# Patient Record
Sex: MALE | Race: WHITE
[De-identification: names, ages, dates, MRNs, and addresses within clinical notes are randomized per-mention and may not be internally consistent; named-entity substitution may affect disease eponyms.]

---

## 2020-11-21 ENCOUNTER — HOSPITAL ENCOUNTER (EMERGENCY)
Dept: HOSPITAL 4 - SED | Age: 35
Discharge: HOME | End: 2020-11-21
Payer: COMMERCIAL

## 2020-11-21 VITALS — HEIGHT: 65 IN | WEIGHT: 140 LBS | BODY MASS INDEX: 23.32 KG/M2 | SYSTOLIC BLOOD PRESSURE: 114 MMHG
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
## 2020-11-21 DIAGNOSIS — S61.412A: Primary | ICD-10-CM
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
## 2020-11-21 NOTE — NUR
Pt came to ER for L hand lac, rates pain 4/10, resting in [facility], no 
distress noted at this time, awaiting MD.

## 2020-11-21 NOTE — NUR
Patient given written and verbal discharge instructions and verbalizes 
understanding.  ER MD discussed with patient the results and treatment 
provided. Patient in stable condition. ID arm band removed.

No Rx of given. Patient educated on pain management and to follow up with PMD. 
Pain Scale 0/10.

Opportunity for questions provided and answered. Medication side effect fact 
sheet provided.

## 2020-12-03 ENCOUNTER — HOSPITAL ENCOUNTER (EMERGENCY)
Dept: HOSPITAL 4 - SED | Age: 35
Discharge: HOME | End: 2020-12-03
Payer: COMMERCIAL

## 2020-12-03 VITALS — SYSTOLIC BLOOD PRESSURE: 122 MMHG

## 2020-12-03 VITALS — HEIGHT: 65 IN | WEIGHT: 148 LBS | BODY MASS INDEX: 24.66 KG/M2

## 2020-12-03 DIAGNOSIS — S61.412D: Primary | ICD-10-CM
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
